# Patient Record
Sex: FEMALE | Race: BLACK OR AFRICAN AMERICAN
[De-identification: names, ages, dates, MRNs, and addresses within clinical notes are randomized per-mention and may not be internally consistent; named-entity substitution may affect disease eponyms.]

---

## 2019-06-12 ENCOUNTER — HOSPITAL ENCOUNTER (OUTPATIENT)
Dept: HOSPITAL 96 - M.ERS | Age: 19
Setting detail: OBSERVATION
LOS: 1 days | Discharge: HOME | End: 2019-06-13
Attending: SURGERY | Admitting: SURGERY
Payer: COMMERCIAL

## 2019-06-12 VITALS — DIASTOLIC BLOOD PRESSURE: 71 MMHG | SYSTOLIC BLOOD PRESSURE: 111 MMHG

## 2019-06-12 VITALS — SYSTOLIC BLOOD PRESSURE: 130 MMHG | DIASTOLIC BLOOD PRESSURE: 76 MMHG

## 2019-06-12 VITALS — SYSTOLIC BLOOD PRESSURE: 102 MMHG | DIASTOLIC BLOOD PRESSURE: 54 MMHG

## 2019-06-12 VITALS — WEIGHT: 100 LBS | BODY MASS INDEX: 16.07 KG/M2 | HEIGHT: 65.98 IN

## 2019-06-12 VITALS — SYSTOLIC BLOOD PRESSURE: 131 MMHG | DIASTOLIC BLOOD PRESSURE: 82 MMHG

## 2019-06-12 DIAGNOSIS — R11.2: ICD-10-CM

## 2019-06-12 DIAGNOSIS — K35.80: Primary | ICD-10-CM

## 2019-06-12 LAB
ALBUMIN SERPL-MCNC: 4.7 G/DL (ref 3.4–5)
ALP SERPL-CCNC: 56 U/L (ref 46–116)
ALT SERPL-CCNC: 26 U/L (ref 30–65)
ANION GAP SERPL CALC-SCNC: 15 MMOL/L (ref 7–16)
AST SERPL-CCNC: 18 U/L (ref 15–37)
BILIRUB SERPL-MCNC: 0.4 MG/DL
BILIRUB UR-MCNC: NEGATIVE MG/DL
BUN SERPL-MCNC: 15 MG/DL (ref 7–18)
CALCIUM SERPL-MCNC: 9.8 MG/DL (ref 8.5–10.1)
CHLORIDE SERPL-SCNC: 107 MMOL/L (ref 98–107)
CO2 SERPL-SCNC: 22 MMOL/L (ref 21–32)
COLOR UR: YELLOW
CREAT SERPL-MCNC: 0.9 MG/DL (ref 0.6–1.3)
GLUCOSE SERPL-MCNC: 119 MG/DL (ref 70–99)
GRANULOCYTES NFR BLD MANUAL: 93 %
HCT VFR BLD CALC: 39.4 % (ref 37–47)
HGB BLD-MCNC: 12.7 GM/DL (ref 12–15)
KETONES UR STRIP-MCNC: NEGATIVE MG/DL
LG PLATELETS BLD QL SMEAR: (no result)
LIPASE: 92 U/L (ref 73–393)
LYMPHOCYTES # BLD: 1.3 THOU/UL (ref 0.8–5.3)
LYMPHOCYTES NFR BLD AUTO: 7 %
MCH RBC QN AUTO: 26.4 PG (ref 26–34)
MCHC RBC AUTO-ENTMCNC: 32.4 G/DL (ref 28–37)
MCV RBC: 81.5 FL (ref 80–100)
MPV: 8 FL. (ref 7.2–11.1)
NEUTROPHILS # BLD: 17.1 THOU/UL (ref 1.6–8.1)
NUCLEATED RBCS: 0 /100WBC
PLATELET # BLD EST: ADEQUATE 10*3/UL
PLATELET CLUMP BLD QL SMEAR: (no result)
PLATELET COUNT*: 349 THOU/UL (ref 150–400)
POTASSIUM SERPL-SCNC: 3.9 MMOL/L (ref 3.5–5.1)
PROT SERPL-MCNC: 9.4 G/DL (ref 6.4–8.2)
PROT UR QL STRIP: NEGATIVE
RBC # BLD AUTO: 4.83 MIL/UL (ref 4.2–5)
RBC # UR STRIP: (no result) /UL
RBC MORPH BLD: NORMAL
RDW-CV: 17.5 % (ref 10.5–14.5)
SODIUM SERPL-SCNC: 144 MMOL/L (ref 136–145)
SP GR UR STRIP: 1.02 (ref 1–1.03)
SQUAMOUS: (no result) /LPF (ref 0–3)
URINE CLARITY: CLEAR
URINE GLUCOSE-RANDOM: NEGATIVE
URINE LEUKOCYTES-REFLEX: NEGATIVE
URINE NITRITE-REFLEX: NEGATIVE
UROBILINOGEN UR STRIP-ACNC: 0.2 E.U./DL (ref 0.2–1)
WBC # BLD AUTO: 18.4 THOU/UL (ref 4–11)

## 2019-06-12 NOTE — NUR
PT ADMITTED TO FLOOR PER BED FROM PACU ACCOMPANIED BY PACU STAFF WITH
BELONGINGS. AOX4, TALKATIVE, PLEASANT. ORIENTED TO ROOM AND CALL LITE. FAMILY
MEMBERS AT BEDSIDE, S/O AT BEDSIDE. HISTORY OBTAINED AND ASSESSMENT
PERFORMED,SEE ADMIT NOTES. ABD LAP SITES WITH DERMABOND INTACT, NO BLEEDING OR
BRUISING NOTED. PT DENIES NAUSEA, SIPPING WATER AND ASKING FOR CRACKERS. CALL
LITE IN EASY REACH,  WILL CONTINUE TO MONITOR AND PROVIDE CARES AS NEEDED.

## 2019-06-13 VITALS — DIASTOLIC BLOOD PRESSURE: 73 MMHG | SYSTOLIC BLOOD PRESSURE: 113 MMHG

## 2019-06-13 VITALS — SYSTOLIC BLOOD PRESSURE: 128 MMHG | DIASTOLIC BLOOD PRESSURE: 86 MMHG

## 2019-06-13 VITALS — SYSTOLIC BLOOD PRESSURE: 120 MMHG | DIASTOLIC BLOOD PRESSURE: 64 MMHG

## 2019-06-13 VITALS — SYSTOLIC BLOOD PRESSURE: 113 MMHG | DIASTOLIC BLOOD PRESSURE: 73 MMHG

## 2019-06-13 NOTE — NUR
PT LAP APPY LAST EVENING, ABD LAP INCISIONS WITH DERMABOND-NO BLEEDING OR
BRUISING NOTED. PT TOLERATING CRACKERS, JELLO AND LIQUIDS WITH OCC
NAUSEA-ZOFRAN GIVEN WITH GOOD RESULT. SCDS ON BLE. RFA SLIV, ABX GIVEN AS
ORDERED. UP WITH SBA TO BR TO VOID. VSS, LOW GRADE TEMP 99.6-ENCOURAGED TO
COUGH AND DEEP BREATHE. FENTANYL GIVEN X2 FOR CO ABD PAIN WITH GOOD RESULT,
STATES SHE WILL TAKE ORAL PAIN MED WITH FOOD THIS MORNING PRN. AOX4, PLEASANT.
ABLE TO USE CALL LITE AND MAKE NEEDS KNOWN. NO LABS THIS MORNING. ANTICIPATING
DC HOME TODAY.

## 2019-06-13 NOTE — NUR
ASSUMED CARE OF PT AROUND 0730 THIS AM. REFER TO ASSESSMENT. PT HAS NO C/O
PAIN AT THIS TIME. TOLERATING DIET. DISCUSSED WITH DR. BRASHER AND ORDERS TO
DC HOME WITH POST OP ORDERS. DISCHARGE INSTRUCTIONS REVIEWED WITH PT AND PT
AND PARENT VERBALIZES UNDERSTANDING. ONE SCRIPT GIVEN FOR AWILDA. NO OTHER
CONCERNS AT THIS TIME. CLWR.WCTM.

## 2019-06-14 NOTE — OP
[facility] 
[address] [place]  [address]                    OPERATIVE REPORT              
_______________________________________________________________________________
 
Name:       TEJINDERRAMÓN NAVEEN                  Room:           [address] [place] DON[place]AN#:  [id number]      Account #:      [id number]  
Admission:  06/12/19     Attend Phys:    Caesar Santos
Discharge:  06/13/19     Date of Birth:  [birth year]  
         Report #: [id number]
                                                                     [id number]  
_______________________________________________________________________________
THIS REPORT FOR:  //name//                      
 
CC: GHAZALA physician/PCP
    Caesar Santos
 
DATE OF SERVICE:  06/12/2019
 
 
PREOPERATIVE DIAGNOSIS:  Acute appendicitis.
 
POSTOPERATIVE DIAGNOSIS:  Acute appendicitis.
 
OPERATION:  Laparoscopic appendectomy.
 
SURGEON:  Caesar Santos MD
 
ANESTHESIA:  General.
 
ESTIMATED BLOOD LOSS:  Minimal.
 
SPECIMENS:  Appendix.
 
DESCRIPTION OF PROCEDURE:  After informed consent was obtained, the patient was
brought to the operating room and placed supine.  SCDs were placed and working,
preoperative antibiotics were administered, general anesthesia was induced.  The
abdomen was prepped and draped in the usual sterile fashion.  A 10 mm incision
was made below the umbilicus.  Fascia was incised and a trocar was placed. 
Pneumoperitoneum was established.  A right upper quadrant and left lower
quadrant 5 mm ports were placed.  The appendix was grasped and retracted
anteriorly.  It was inflamed and edematous.  A window was made in the
mesoappendix.  The mesoappendix was ligated with a BLANCA white load stapler.  The
base of the appendix was then stapled with a BLANCA purple load stapler.  The
appendix was then removed through an Endopouch.
 
The fascia at the umbilicus was closed with figure-of-eight 0 Vicryl.  Skin was
closed with 4-0 Monocryl.  Incisions were sealed with Dermabond.
 
COMPLICATIONS:  None.
 
DISPOSITION:  The patient was taken to recovery in satisfactory condition.
 
 
 
 
<ELECTRONICALLY SIGNED>
                                        By:  Caesar Santos MD     
06/14/19     0803
D: 06/12/19 1836_______________________________________
T: 06/12/19 1955Caesar Santos MD        /nt

## 2019-06-14 NOTE — PATH
[address]  [address]                    PATHOLOGY RPT PROCEDURE       
_______________________________________________________________________________
 
Name:       MARY JO MARR                  Room:           [address] 
VENUS#:  [id number]      Account #:      [id number]  
Admission:  06/12/19     Date of Birth:  [birth year]  
Discharge:  06/13/19                   Report #:    [id number]
                                                         Path Case #: [id number]
_______________________________________________________________________________
 
[facility] Accession Number: [id number]
.                                                                01
Material submitted:                                        .
appendix - APPENDIX
.                                                                01
Clinical history:                                          .
Appendicitis
.                                                                02
**********************************************************************
Diagnosis:
Appendix:
- Early acute appendicitis.
(DOV:pedro; 06/14/2019)
MBR/06/14/2019
**********************************************************************
.                                                                02
Electronically signed:                                     .
Wily Brand MD, Pathologist
NPI- [id number]
.                                                                01
Gross description:                                         .
Received in formalin, labeled "Mary Jo Marr, appendix ", is an intact
appendix (9.7 cm length x 0.8 cm diameter) and attached mesoappendix (6.0
x 1.0 x 0.5 cm).  The proximal resection margin is closed by a linear
staple line, 1.7 cm with an average 0.2 cm width.  The staple line and the
adjacent serosa is inked black.  The serosa is tan with distended
vessels and no perforation and possible gray-white exudate towards the
distal aspect. The lumen is dilated and filled with hemorrhagic to tan
yellow purulent material and no discrete fecalith. The wall has an average
thickness of 0.2 cm. The anibal-appendiceal soft tissue has a yellow cut
surface. Representative tissue is submitted in A1. ([facility]; 6/13/2019)
SHS/SHS
.                                                                02
Pathologist provided ICD-10:
K35.80
.                                                                02
CPT                                                        .
162084
Specimen Comment: A courtesy copy of this report has been sent to
Specimen Comment: [phone].
Specimen Comment: Report sent to 
***Performed at:  01
   [address]  [id number]
   MD Ankit Gunderson MD Phone:  [phone]
***Performed at:  02
   [address]  [id number]
 
[place] [address]                    PATHOLOGY RPT PROCEDURE       
_______________________________________________________________________________
 
Name:       MARY JO MARR                  Room:           [address] [place]#:  [id number]      Account #:      [id number]  
Admission:  06/12/19     Date of Birth:  [birth year]  
Discharge:  06/13/19                   Report #:    [id number]
                                                         Path Case #: [id number]
_______________________________________________________________________________
   [facility] MD Phone:  [phone]